# Patient Record
Sex: FEMALE | Race: WHITE | NOT HISPANIC OR LATINO | Employment: OTHER | URBAN - METROPOLITAN AREA
[De-identification: names, ages, dates, MRNs, and addresses within clinical notes are randomized per-mention and may not be internally consistent; named-entity substitution may affect disease eponyms.]

---

## 2021-08-02 ENCOUNTER — TRANSFERRED RECORDS (OUTPATIENT)
Dept: HEALTH INFORMATION MANAGEMENT | Facility: CLINIC | Age: 66
End: 2021-08-02

## 2022-02-15 DIAGNOSIS — I87.1 COMPRESSION OF VEIN: ICD-10-CM

## 2022-02-15 DIAGNOSIS — R42 MAL DE DEBARQUEMENT: ICD-10-CM

## 2022-02-15 DIAGNOSIS — G54.0 TOS (THORACIC OUTLET SYNDROME): Primary | ICD-10-CM

## 2022-03-22 ENCOUNTER — TRANSFERRED RECORDS (OUTPATIENT)
Dept: HEALTH INFORMATION MANAGEMENT | Facility: CLINIC | Age: 67
End: 2022-03-22
Payer: COMMERCIAL

## 2022-04-21 NOTE — TELEPHONE ENCOUNTER
FUTURE VISIT INFORMATION      FUTURE VISIT INFORMATION:    Date: 5/25/2022    Time: 130pm    Location: [facility]  REFERRAL INFORMATION:    Referring provider:  Self     Referring providers clinic:      Reason for visit/diagnosis  Mal De Debarquement     RECORDS REQUESTED FROM:       Clinic name Comments Records Status Imaging Status   [facility] Everywhere Requested                                    5/5/2022-[facility]    5/16/2022-2nd request for images to be mailed faxed to [facility] @ 835am

## 2022-05-23 ENCOUNTER — NURSE TRIAGE (OUTPATIENT)
Dept: NURSING | Facility: CLINIC | Age: 67
End: 2022-05-23

## 2022-05-23 ENCOUNTER — ANCILLARY PROCEDURE (OUTPATIENT)
Dept: CT IMAGING | Facility: CLINIC | Age: 67
End: 2022-05-23
Attending: PSYCHIATRY & NEUROLOGY
Payer: COMMERCIAL

## 2022-05-23 ENCOUNTER — APPOINTMENT (OUTPATIENT)
Dept: ULTRASOUND IMAGING | Facility: CLINIC | Age: 67
End: 2022-05-23
Attending: EMERGENCY MEDICINE
Payer: COMMERCIAL

## 2022-05-23 ENCOUNTER — HOSPITAL ENCOUNTER (EMERGENCY)
Facility: CLINIC | Age: 67
Discharge: HOME | End: 2022-05-23
Attending: EMERGENCY MEDICINE | Admitting: EMERGENCY MEDICINE
Payer: COMMERCIAL

## 2022-05-23 ENCOUNTER — ANCILLARY PROCEDURE (OUTPATIENT)
Dept: ULTRASOUND IMAGING | Facility: CLINIC | Age: 67
End: 2022-05-23
Attending: PSYCHIATRY & NEUROLOGY
Payer: COMMERCIAL

## 2022-05-23 VITALS
DIASTOLIC BLOOD PRESSURE: 84 MMHG | OXYGEN SATURATION: 97 % | RESPIRATION RATE: 16 BRPM | TEMPERATURE: 98 F | HEIGHT: 63 IN | SYSTOLIC BLOOD PRESSURE: 139 MMHG | WEIGHT: 140 LBS | HEART RATE: 85 BPM | BODY MASS INDEX: 24.8 KG/M2

## 2022-05-23 DIAGNOSIS — R42 MAL DE DEBARQUEMENT: ICD-10-CM

## 2022-05-23 DIAGNOSIS — I87.1 COMPRESSION OF VEIN: ICD-10-CM

## 2022-05-23 DIAGNOSIS — N95.0 POSTMENOPAUSAL VAGINAL BLEEDING: ICD-10-CM

## 2022-05-23 DIAGNOSIS — G54.0 TOS (THORACIC OUTLET SYNDROME): ICD-10-CM

## 2022-05-23 LAB
ABO/RH(D): NORMAL
ANION GAP SERPL CALCULATED.3IONS-SCNC: 7 MMOL/L (ref 3–14)
ANTIBODY SCREEN: NEGATIVE
APTT PPP: 31 SECONDS (ref 22–38)
BASOPHILS # BLD AUTO: 0.1 10E3/UL (ref 0–0.2)
BASOPHILS NFR BLD AUTO: 1 %
BUN SERPL-MCNC: 22 MG/DL (ref 7–30)
C TRACH DNA SPEC QL NAA+PROBE: NEGATIVE
CALCIUM SERPL-MCNC: 9.1 MG/DL (ref 8.5–10.1)
CHLORIDE BLD-SCNC: 105 MMOL/L (ref 94–109)
CLUE CELLS: NORMAL
CO2 SERPL-SCNC: 24 MMOL/L (ref 20–32)
CREAT SERPL-MCNC: 0.83 MG/DL (ref 0.52–1.04)
EOSINOPHIL # BLD AUTO: 0.2 10E3/UL (ref 0–0.7)
EOSINOPHIL NFR BLD AUTO: 2 %
ERYTHROCYTE [DISTWIDTH] IN BLOOD BY AUTOMATED COUNT: 13 % (ref 10–15)
GFR SERPL CREATININE-BSD FRML MDRD: 78 ML/MIN/1.73M2
GLUCOSE BLD-MCNC: 94 MG/DL (ref 70–99)
HCT VFR BLD AUTO: 37 % (ref 35–47)
HGB BLD-MCNC: 12.4 G/DL (ref 11.7–15.7)
IMM GRANULOCYTES # BLD: 0 10E3/UL
IMM GRANULOCYTES NFR BLD: 0 %
INR PPP: 0.99 (ref 0.85–1.15)
LYMPHOCYTES # BLD AUTO: 1.8 10E3/UL (ref 0.8–5.3)
LYMPHOCYTES NFR BLD AUTO: 25 %
MCH RBC QN AUTO: 32 PG (ref 26.5–33)
MCHC RBC AUTO-ENTMCNC: 33.5 G/DL (ref 31.5–36.5)
MCV RBC AUTO: 96 FL (ref 78–100)
MONOCYTES # BLD AUTO: 0.8 10E3/UL (ref 0–1.3)
MONOCYTES NFR BLD AUTO: 11 %
N GONORRHOEA DNA SPEC QL NAA+PROBE: NEGATIVE
NEUTROPHILS # BLD AUTO: 4.6 10E3/UL (ref 1.6–8.3)
NEUTROPHILS NFR BLD AUTO: 61 %
NRBC # BLD AUTO: 0 10E3/UL
NRBC BLD AUTO-RTO: 0 /100
PLATELET # BLD AUTO: 334 10E3/UL (ref 150–450)
POTASSIUM BLD-SCNC: 4 MMOL/L (ref 3.4–5.3)
RADIOLOGIST FLAGS: NORMAL
RBC # BLD AUTO: 3.87 10E6/UL (ref 3.8–5.2)
SODIUM SERPL-SCNC: 136 MMOL/L (ref 133–144)
SPECIMEN EXPIRATION DATE: NORMAL
TRICHOMONAS, WET PREP: NORMAL
WBC # BLD AUTO: 7.5 10E3/UL (ref 4–11)
WBC'S/HIGH POWER FIELD, WET PREP: NORMAL
YEAST, WET PREP: NORMAL

## 2022-05-23 PROCEDURE — 85730 THROMBOPLASTIN TIME PARTIAL: CPT | Performed by: EMERGENCY MEDICINE

## 2022-05-23 PROCEDURE — 96361 HYDRATE IV INFUSION ADD-ON: CPT | Performed by: EMERGENCY MEDICINE

## 2022-05-23 PROCEDURE — 70498 CT ANGIOGRAPHY NECK: CPT | Mod: GC | Performed by: RADIOLOGY

## 2022-05-23 PROCEDURE — 99284 EMERGENCY DEPT VISIT MOD MDM: CPT | Performed by: EMERGENCY MEDICINE

## 2022-05-23 PROCEDURE — 99284 EMERGENCY DEPT VISIT MOD MDM: CPT | Mod: 25 | Performed by: EMERGENCY MEDICINE

## 2022-05-23 PROCEDURE — 76830 TRANSVAGINAL US NON-OB: CPT

## 2022-05-23 PROCEDURE — 96360 HYDRATION IV INFUSION INIT: CPT | Performed by: EMERGENCY MEDICINE

## 2022-05-23 PROCEDURE — 85610 PROTHROMBIN TIME: CPT | Performed by: EMERGENCY MEDICINE

## 2022-05-23 PROCEDURE — 87491 CHLMYD TRACH DNA AMP PROBE: CPT | Performed by: EMERGENCY MEDICINE

## 2022-05-23 PROCEDURE — 93970 EXTREMITY STUDY: CPT | Performed by: RADIOLOGY

## 2022-05-23 PROCEDURE — 86901 BLOOD TYPING SEROLOGIC RH(D): CPT | Performed by: EMERGENCY MEDICINE

## 2022-05-23 PROCEDURE — 93930 UPPER EXTREMITY STUDY: CPT | Performed by: RADIOLOGY

## 2022-05-23 PROCEDURE — 36415 COLL VENOUS BLD VENIPUNCTURE: CPT | Performed by: EMERGENCY MEDICINE

## 2022-05-23 PROCEDURE — 76856 US EXAM PELVIC COMPLETE: CPT | Mod: 26 | Performed by: STUDENT IN AN ORGANIZED HEALTH CARE EDUCATION/TRAINING PROGRAM

## 2022-05-23 PROCEDURE — 87591 N.GONORRHOEAE DNA AMP PROB: CPT | Performed by: EMERGENCY MEDICINE

## 2022-05-23 PROCEDURE — 87210 SMEAR WET MOUNT SALINE/INK: CPT | Performed by: EMERGENCY MEDICINE

## 2022-05-23 PROCEDURE — 86850 RBC ANTIBODY SCREEN: CPT | Performed by: EMERGENCY MEDICINE

## 2022-05-23 PROCEDURE — 258N000003 HC RX IP 258 OP 636: Performed by: EMERGENCY MEDICINE

## 2022-05-23 PROCEDURE — 76830 TRANSVAGINAL US NON-OB: CPT | Mod: 26 | Performed by: STUDENT IN AN ORGANIZED HEALTH CARE EDUCATION/TRAINING PROGRAM

## 2022-05-23 PROCEDURE — 80048 BASIC METABOLIC PNL TOTAL CA: CPT | Performed by: EMERGENCY MEDICINE

## 2022-05-23 PROCEDURE — 85025 COMPLETE CBC W/AUTO DIFF WBC: CPT | Performed by: EMERGENCY MEDICINE

## 2022-05-23 PROCEDURE — 70496 CT ANGIOGRAPHY HEAD: CPT | Mod: GC | Performed by: RADIOLOGY

## 2022-05-23 RX ORDER — IOPAMIDOL 755 MG/ML
75 INJECTION, SOLUTION INTRAVASCULAR ONCE
Status: COMPLETED | OUTPATIENT
Start: 2022-05-23 | End: 2022-05-23

## 2022-05-23 RX ORDER — SODIUM CHLORIDE 9 MG/ML
INJECTION, SOLUTION INTRAVENOUS CONTINUOUS
Status: DISCONTINUED | OUTPATIENT
Start: 2022-05-23 | End: 2022-05-23 | Stop reason: HOSPADM

## 2022-05-23 RX ADMIN — SODIUM CHLORIDE 1000 ML: 9 INJECTION, SOLUTION INTRAVENOUS at 04:02

## 2022-05-23 RX ADMIN — IOPAMIDOL 75 ML: 755 INJECTION, SOLUTION INTRAVASCULAR at 10:54

## 2022-05-23 NOTE — ED TRIAGE NOTES
"Pt arrives ambulatory to triage w/ c/o vaginal bleeding since 12 am. Felt cramping in her abdomen and took motrin around 8 pm and felt a little \"off\". Hasn't had a period in 15-20 years. Takes estrogen and progesteron for menopause issues. No N/V. Flew in from out of town today for a brain mri tomorrow and to see a specialist here. A&O, VSS.       "

## 2022-05-23 NOTE — DISCHARGE INSTRUCTIONS
Please be sure to follow-up with your gynecologist upon return to [place]    Return immediately for any worsening vaginal bleeding, lightheadedness, weakness, abdominal pain or any other concerns for worsening.    Continue taking your regular medications.

## 2022-05-23 NOTE — ED PROVIDER NOTES
"ED Provider Note  [facility]      History     Chief Complaint   Patient presents with     Vaginal Bleeding     HPI  Tasha Bernstein is a 66 year old female with hx of migraines; HLD, nephrolithiasis, s/p menopause, OA and DJD, currently undergoing work-up for gait and movement abnormalities by neurology, who presents with vaginal bleeding.    Patient has been on estradiol and progesterone for postmenopausal syndrome.  No change in medication since October    Per her report she has been seen by gynecology in the past for evaluation of her endometrial lining the details of this are uncertain at this time.    She has flown from [place] to [facility] for evaluation by neurology.  Tonight acutely developed sensation of suprapubic cramping and significant vaginal bleeding-large clot while sitting on toilet.  Has soaked tampons/pads in approximately 2 hours but now believes that the bleeding has slowed down.    No associated lightheadedness or weakness.   No chest pain or shortness of breath.  Has had no vaginal bleeding since became postmenopausal.    No fevers or chills no dysuria, no preceding vaginal discharge.          Past Medical History  No past medical history on file.  No past surgical history on file.  No current outpatient medications on file.    Allergies   Allergen Reactions     Azithromycin      Bees      Erythromycin      Family History  No family history on file.  Social History       Past medical history, past surgical history, medications, allergies, family history, and social history were reviewed with the patient. No additional pertinent items.       Review of Systems  A complete review of systems was performed with pertinent positives and negatives noted in the HPI, and all other systems negative.    Physical Exam   BP: (!) 135/107  Pulse: 78  Temp: 98  F (36.7  C)  Resp: 16  Height: 160 cm (5' 3\")  Weight: 63.5 kg (140 lb)  SpO2: 99 %  Physical Exam  GEN: Well " appearing, non toxic, cooperative and conversant.   HEENT: The head is normocephalic and atraumatic. Pupils are equal round and reactive to light. Extraocular motions are intact. There is no facial swelling.   CV: Regular rate   PULM: Unlabored breathing     EXT: Full range of motion.  No edema.  NEURO: Cranial nerves II through XII are intact and symmetric. Bilateral upper and lower extremities grossly show full range of motion without any focal deficits.     PSYCH: Calm and cooperative, interactive.   Pelvic exam:  Normal external female genitalia. Normal vaginal mucosa. No discharge or drainage.  The cervical Os is closed.  No CMT no adnexal TTP.   No Masses noted.      ED Course      Procedures                     Results for orders placed or performed during the hospital encounter of 05/23/22   US Pelvic Complete with Transvaginal     Status: None (Preliminary result)    Impression    RESIDENT PRELIMINARY INTERPRETATION  IMPRESSION:   1.  The endometrium is heterogeneous and thickened measuring up to 1.2  cm. In a postmenopausal patient this is considered thickened.  Recommend OB/GYN consultation.  2.  The ovaries are not well seen. No suspicious adnexal lesions.     Basic metabolic panel     Status: Normal   Result Value Ref Range    Sodium 136 133 - 144 mmol/L    Potassium 4.0 3.4 - 5.3 mmol/L    Chloride 105 94 - 109 mmol/L    Carbon Dioxide (CO2) 24 20 - 32 mmol/L    Anion Gap 7 3 - 14 mmol/L    Urea Nitrogen 22 7 - 30 mg/dL    Creatinine 0.83 0.52 - 1.04 mg/dL    Calcium 9.1 8.5 - 10.1 mg/dL    Glucose 94 70 - 99 mg/dL    GFR Estimate 78 >60 mL/min/1.73m2   INR     Status: Normal   Result Value Ref Range    INR 0.99 0.85 - 1.15   Partial thromboplastin time     Status: Normal   Result Value Ref Range    aPTT 31 22 - 38 Seconds   CBC with platelets and differential     Status: None   Result Value Ref Range    WBC Count 7.5 4.0 - 11.0 10e3/uL    RBC Count 3.87 3.80 - 5.20 10e6/uL    Hemoglobin 12.4 11.7 -  15.7 g/dL    Hematocrit 37.0 35.0 - 47.0 %    MCV 96 78 - 100 fL    MCH 32.0 26.5 - 33.0 pg    MCHC 33.5 31.5 - 36.5 g/dL    RDW 13.0 10.0 - 15.0 %    Platelet Count 334 150 - 450 10e3/uL    % Neutrophils 61 %    % Lymphocytes 25 %    % Monocytes 11 %    % Eosinophils 2 %    % Basophils 1 %    % Immature Granulocytes 0 %    NRBCs per 100 WBC 0 <1 /100    Absolute Neutrophils 4.6 1.6 - 8.3 10e3/uL    Absolute Lymphocytes 1.8 0.8 - 5.3 10e3/uL    Absolute Monocytes 0.8 0.0 - 1.3 10e3/uL    Absolute Eosinophils 0.2 0.0 - 0.7 10e3/uL    Absolute Basophils 0.1 0.0 - 0.2 10e3/uL    Absolute Immature Granulocytes 0.0 <=0.4 10e3/uL    Absolute NRBCs 0.0 10e3/uL   Adult Type and Screen     Status: None   Result Value Ref Range    ABO/RH(D) O POS     Antibody Screen Negative Negative    SPECIMEN EXPIRATION DATE [id number]    Wet preparation     Status: Normal    Specimen: Vagina; Swab   Result Value Ref Range    Trichomonas Absent Absent    Yeast Absent Absent    Clue Cells Absent Absent    WBCs/high power field None None   CBC with platelets differential     Status: None    Narrative    The following orders were created for panel order CBC with platelets differential.  Procedure                               Abnormality         Status                     ---------                               -----------         ------                     CBC with platelets and d...[[id number]]                      Final result                 Please view results for these tests on the individual orders.   ABO/Rh type and screen     Status: None    Narrative    The following orders were created for panel order ABO/Rh type and screen.  Procedure                               Abnormality         Status                     ---------                               -----------         ------                     Adult Type and Screen[[id number]]                            Final result                 Please view results for these tests on the  individual orders.     Medications   0.9% sodium chloride BOLUS (0 mLs Intravenous Stopped 5/23/22 0620)     Followed by   sodium chloride 0.9% infusion ( Intravenous Not Given 5/23/22 0635)        Assessments & Plan (with Medical Decision Making)   66-year-old female with history as noted presenting with postmenopausal bleeding    DDx includes fibroids, malignancy, hormonal responses to estradiol/progesterone (less likely), trauma, coagulopathy, thrombocytopenia, among other causes    Clinically the patient is nontoxic and well-appearing.  Laboratories notable for normal platelets and hemoglobin 12.5  Pelvic exam with with blood in vaginal vault.  No active bleeding through cervix, closed os    Pelvic ultrasound ordered to evaluate for gross malignant lesions and none is identified.  Endometrium appears thickened for postmenopausal state as noted above.     - SERP  - GYN f/u as outpt- has this arranged for when returns to [place].   - continue regular medications     - Patient agrees to our plan and is ready and eager for discharge. Care plan, follow up plan, and reasons to return immediately to the ED were dicussed in detail and summarized as noted in the discharge instructions.      I have reviewed the nursing notes. I have reviewed the findings, diagnosis, plan and need for follow up with the patient.    New Prescriptions    No medications on file       Final diagnoses:   Postmenopausal vaginal bleeding       --  Haider Livingston MD   [facility] EMERGENCY DEPARTMENT  5/23/2022     Haider Livingston MD  05/23/22 2516

## 2022-05-23 NOTE — TELEPHONE ENCOUNTER
Pt called stating that she is bleeding vaginally and she has not had a period in over 10 years.     Pt is having some cramping     Pt states that she is going through over 2 tampons in a hour, pt was sitting on the toilet during the call because of the heavy bleeding      Per protocol - Go to ED now     Care advice given per protocol and when to call back. Pt verbalized understanding and agrees to plan of care.    Yamel Queen RN  [facility] Nurse Advisor  1:47 AM 5/23/2022      COVID 19 Nurse Triage Plan/Patient Instructions    Please be aware that novel coronavirus (COVID-19) may be circulating in the community. If you develop symptoms such as fever, cough, or SOB or if you have concerns about the presence of another infection including coronavirus (COVID-19), please contact your health care provider or visit [email or web address].     Disposition/Instructions    ED Visit recommended. Follow protocol based instructions.     Bring Your Own Device:  Please also bring your smart device(s) (smart phones, tablets, laptops) and their charging cables for your personal use and to communicate with your care team during your visit.    Thank you for taking steps to prevent the spread of this virus.  o Limit your contact with others.  o Wear a simple mask to cover your cough.  o Wash your hands well and often.    Resources    [facility]: About COVID-19: [email or web address]    CDC: What to Do If You're Sick: www.cdc.gov/coronavirus/2019-ncov/about/steps-when-sick.html    CDC: Ending Home Isolation: www.cdc.gov/coronavirus/2019-ncov/hcp/disposition-in-home-patients.html     CDC: Caring for Someone: www.cdc.gov/coronavirus/2019-ncov/if-you-are-sick/care-for-someone.html     [facility]: Interim Guidance for Hospital Discharge to Home: www.health.[facility].[place]/diseases/coronavirus/hcp/hospdischarge.pdf    [facility] clinical trials (COVID-19 research studies):  clinicalaffairs.[facility]-clinical-trials     Below are the COVID-19 hotlines at the [place] Department of Health ([facility]). Interpreters are available.   o For health questions: Call 868-711-3097 or 1-622.141.5343 (7 a.m. to 7 p.m.)  o For questions about schools and childcare: Call 083-279-8855 or 1-489.877.5695 (7 a.m. to 7 p.m.)      Reason for Disposition    Vaginal bleeding is main symptom    SEVERE vaginal bleeding (e.g., soaking 2 pads or tampons per hour and present 2 or more hours; 1 menstrual cup every 2 hours)    Additional Information    Negative: Sounds like a life-threatening emergency to the triager    Negative: Followed a genital area injury    Negative: Foreign body in vagina (e.g., tampon)    Negative: Shock suspected (e.g., cold/pale/clammy skin, too weak to stand, low BP, rapid pulse)    Negative: Difficult to awaken or acting confused (e.g., disoriented, slurred speech)    Negative: Passed out (i.e., lost consciousness, collapsed and was not responding)    Negative: Sounds like a life-threatening emergency to the triager    Negative: Followed a genital area injury    Negative: Pregnant > 20 weeks  (5 months or more)    Negative: Pregnant < 20 weeks  (less than 5 months)    Negative: Postpartum (from 0 to 6 weeks after delivery)    Negative: Bleeding occurring > 12 months after menopause    Negative: Bleeding from sexual abuse or rape    Negative: [1] Vaginal discharge is main symptom AND [2] small amount of blood    Negative: SEVERE abdominal pain    Negative: SEVERE dizziness (e.g., unable to stand, requires support to walk, feels like passing out now)    Protocols used: VAGINAL SYMPTOMS-A-AH, VAGINAL BLEEDING - [id number]

## 2022-05-23 NOTE — RESULT ENCOUNTER NOTE
Final result for both N. Gonorrhoeae PCR and Chlamydia Trachomatis PCR are NEGATIVE.  No treatment or change in treatment per [facility] ED Lab Result N. Gonorrhea AND/OR Chlamydia T. protocol.

## 2022-05-24 NOTE — PROGRESS NOTES
[facility]    Neurology Consult    5/24/2022      Tasha Bernstein MRN# [id number]   YOB: 1955 Age: 66 year old      Primary care provider:   System, Provider Not In    Requesting provider:   Self    Reason for Consult:  Mal de Debarquement Syndrome    IMPRESSIONS:  Tasha Bernstein is a 66-year-old woman with a past medical history of migraine headaches going back to age 11 with symptoms predominantly on the left side over the left eye with radiation to the occiput shoulder and neck developed persistent oscillating vertigo since a trip to [facility] in October 2021. She had a prior episode of persistent Mal de Debarquement Syndrome (MdDS) that resolved on its own. There is reduced venous velocities in the left subclavian vein with reduced internal jugular vein velocities with leftward head turning. This would suggest that the internal jugular is being compressed under the sternocleidomastoid muscle since there is no compression at the skull base. She may get some benefit from focused physical therapy to relax these muscles (SCM, anterior scalene muscles). She can also palliate with trying a little bit of clonazepam first and then venlafaxine if not sufficiently effective. Since she is from [place], she will need local management of her medications but I would be happy to be available for discussion with her local physicians.     Recommendations:  1. Physical therapy--1st rib mobilization, anterior scalene stretches, sternocleidomastoid, ergonomic awareness. Do not sleep with arms elevated.   2. Follow-up after imaging  3.   Clonazepam 0.25mg once or twice a day to start. This can be escalate to not more than 0.5mg twice a day  4.   Could also start with venlafaxine 12.5mg to start with a gradual increase to 37.5mg as tolerated    HISTORY OF PRESENT ILLNESS:  Tasha Bernstein is a 66 year old female with a past medical history of migraines and persistent  "MdDS.    DIZZINESS:  She has had been on many cruises, about 10 in her lifetime, without any problems but the more recent trips were associated with some post motion rocking for a short time. After a cruise in 2018, she had developed rocking vertigo ([facility]) for 4-5 months that resolved on its own. She went on a cruise in May 2019 ([place]) without problems. She went on a 3-4 hour boat trip for  after that which was okay, too.     She went on a trip to [facility] 2021 which has been persistent. She took a small plane to [facility] from [facility] and took a small boat to a [place]. She had a great time on the trip. She had minor rocking while she was there. Her problems were really bad when she returned to the [facility]. She felt the rocking immediately after landing back in the [facility]. She also had a terrible headache in the middle of the trip while in [facility] but this was a typical migraine for her.      The current feeling is a swaying and a rocking. She feels better when she is driving. She feels worse walking. She has not been able to \"walk it off.\" She feels that it doesn't change with laying down but she does feel it when laying down. It doesn't wake her up. There is no body position effect.     Symptoms did get worse in 2021 after a [place] light show (got on a bus with lots of lights through a garden). She was also worse walking through a casino. Visual stimulation does not usually worsen symptoms, however except in these extreme events.     She does not notice the rocking going with her pulse. She has an escalation of symptoms after activity and being in car. She has not noticed any head position that worsens the rocking. She does lose her balance with head extension and bending over. It can make her nauseated and fatigued. She can also seem a bit 'scattered.' She has not been able to focus like she used to. 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    She has tried acetazolamide (125mg three times a day--taken for about two weeks) with no improvement. She tried spironolactone and had a bad reaction. She couldn't walk because of worsened imbalance and worsened rocking. She has never tried clonazepam or venlafaxine.     AURAL SYMPTOMS:  There is tinnitus in the left ear, since at least 2016. It is not a pulsatile. It is high pitched. It is not in the right ear. There is no ear pressure. There is some hearing loss in the left ear.     HEADACHE: Headache is described as a pulsation and pressure. Headache has been treated with acetazolamide without success. Patient does have a history of renal stones. The headaches have been left sided left eye, left ear, left neck, and left shoulder. The pain is behind the eye, pressure from the back. There is no visual problem. There is no tearing, redness, lid drooping. There is no visual aura. There is nausea but no emesis. This has been treated with fiorinal with effect and has been transitioned to Nurtec but she has not tried the Nurtec, yet. She has had 30-50 migraines in the last year. They tend to cluster. They last about a day. She has done very well this year, however as her last migraine was on 12-29-21.  The migraine severity does not correlate with the rocking.     NECK/UPPER EXT SYMPTOMS:  The left sided neck pain is only there when she has a migraine. The pain does not go down the arm or hand. There has been numbness and tingling in the hands with some sense of throbbing in both hands as a separate phenomenon. She has had a 'zinging,' sensation down the underarm down the left side, and maybe the right. There is no swelling, color changes in the hands. There is no arm position dependence. The paresthesias did improve with pectoralis stretches on a foam roller. Patient notes sleeping with her arms elevated.     BULBAR SYMPTOMS:  The is no dysphagia, throat pressure, chronic cough.    CARDIAC:  There is no  chest pain, shortness of breath, palpitation, syncope.    OTHER:   She had vaginal bleeding recently. She has pelvic pressure.    PAST MEDICAL HISTORY:  Arthritis  Skin cancers removed  Kidney stones  Narcotic inefficacy    PAST SURGICAL HISTORY:  Skin cancers removed     SOCIAL HISTORY:     ALLERGIES:  Allergies   Allergen Reactions     Azithromycin      Bees      Erythromycin      MEDICATIONS:    Current Outpatient Medications:      albuterol (PROAIR HFA/PROVENTIL HFA/VENTOLIN HFA) 108 (90 Base) MCG/ACT inhaler, INHALE 1 PUFF EVERY FOUR HOURS AS NEEDED FOR SHORTNESS OF BREATH OR WHEEZING, Disp: , Rfl:      cholecalciferol 50 MCG (2000 UT) tablet, Take 2,000 Units by mouth, Disp: , Rfl:      diclofenac (VOLTAREN) 1 % topical gel, APPLY 2 G TOPICALLY FOUR (4) TIMES DAILY AS NEEDED., Disp: , Rfl:      hydrocortisone 2.5 % cream, , Disp: , Rfl:      progesterone (PROMETRIUM) 100 MG capsule, TAKE 1 CAPSULE BY MOUTH EVERY DAY, Disp: , Rfl:      Rimegepant Sulfate (NURTEC) 75 MG TBDP, Take 75 mg by mouth, Disp: , Rfl:      valACYclovir (VALTREX) 1000 mg tablet, Take 1,000 mg by mouth, Disp: , Rfl:      PHYSICAL EXAM:  Vitals:  BP (!) 138/95   Pulse 92   Resp 16   Wt 65.3 kg (144 lb)   SpO2 97%   BMI 25.51 kg/m      General: Patient is well-nourished, well-groomed, in no apparent distress. Rocking frequency is 0.703 Hz    HEENT: Head is atraumatic, eyes look normal exteriorly, throat clear, neck supple. Pain under the left ear behind angle of jaw. There is some right sided neck tenderness.   Ext: Warm, well-perfused. No edema.    Neurologic:  Mental Status: Alert and oriented to person, place, time, and situation.  Able to provide an excellent history.     Cranial Nerves: Visual fields full to confrontation.  Pupils equal and reactive to light.  Extraocular movements full.  Face sensation normal.  Normal head impulse testing.  Normal hearing to finger rub. Face symmetric with normal movements. Tongue and  palate midline.  Normal shoulder elevation.      Motor: Normal bulk and tone.  No pronator drift.  Normal foot taps.  Full strength to confrontational testing.    Sensory: Normal light touch, temperature, and vibration.    Reflexes: Biceps, Brachioradialis, Triceps, Knees, Ankles 2/4.     Coordination: Normal finger to nose, rapid alternating movements    Gait: Normal stance width.  Negative Romberg.  Good gait initiation.  Good stride length.  Good arm swing.  Normal turn. Able to walk 5 steps in tandem with some difficulty but no side-stepping.      Focused Vestibular:  Positional Testing: Patient was placed in the supine, right ear down, left ear down, right head-hanging, center-hanging, and left head-hanging positions.  There was no nystagmus or vertigo elicited.     Adson's test for Thoracic Outlet Syndrome:  Arms abducted: Negative     Pain under the RIGHT clavicle: No  Pain at the RIGHT 1st rib-sternum insertion site: No  Pain under the LEFT clavicle: No  Pain at the LEFT 1st rib-sternum insertion site: No     DATA:  All available and relevant labs, imaging, and other procedures were reviewed personally.   Last brain imaging:    CTV Head w Contrast 5-23-22  Narrative: CT venogram without and with intravenous contrast  Reconstruction by the Radiologist on the 3D workstation    History:  66F with chronic rocking vertigo. Rule out venous  compression. Styloid length.; TOS (thoracic outlet syndrome);  Compression of vein; Mal de debarquement.  ICD-10: TOS (thoracic outlet syndrome); Compression of vein; Mal de  debarquement    Head CTV demonstrates no definite occlusion or thrombus within the  major dural and deep intracranial venous sinuses.  The major intracranial arteries are grossly patent without definite  aneurysm or stenosis.    CT NECK: Evaluation of the mucosal space demonstrates no evident  abnormality in the nasopharynx, oropharynx, hypopharynx or the  glottis. The tongue base appears normal. The major  salivary glands  appear unremarkable. The thyroid gland appears normal. The left  styloid process is approximately 15.5 mm, the right styloid process  measures approximately 13.5 mm.    There is no evident cervical lymphadenopathy. The fascial spaces in  the neck are intact bilaterally. The major vascular structures in the  neck appear unremarkable.    Evaluation of the osseous structures demonstrate no worrisome lytic or  sclerotic lesion. Multilevel degenerative changes of the cervical  spine. There is moderate spinal canal narrowing most prominent at the  C5-6 level. There is multilevel neural foraminal narrowing. The  visualized paranasal sinuses are clear. The mastoid air cells are  clear.     The visualized lung apices are clear.  Impression: Impression:  1. No evidence of  thrombus intracranially within the major  intracranial venous sinuses.  2. Normal CT study of the neck with contrast.  No evident mass or  adenopathy within the neck.    I have personally reviewed the examination and initial interpretation  and I agree with the findings.    SOPHIA MARTINEZ MD       [facility] [employer] [facility] Thor Outlet Syn Bilat  THORACIC INLET/OUTLET DUPLEX ULTRASOUND 5/23/2022    FINDINGS:  RIGHT:       REST:            INTERNAL JUGULAR VEIN: 45 cm/s, phasic, fully compressible            INNOMINATE VEIN: 40 cm/s, phasic            SUBCLAVIAN VEIN, medial: 150 cm/s, phasic            SUBCLAVIAN VEIN, mid: 79 cm/s, phasic, fully compressible            SUBCLAVIAN VEIN, lateral: 58 cm/s, phasic, fully  compressible            AXILLARY VEIN: 46 cm/s, phasic, fully compressible         MID SUBCLAVIAN VEIN, sitting erect:            0 degrees: 34 cm/s, phasic            90 degrees: 43 cm/s, phasic            135 degrees: 192 cm/s, phasic            180 degrees: 173 cm/s, phasic         INTERNAL JUGULAR VEIN, sitting erect:            Neutral: 70 cm/s, phasic            Right: 80 cm/s, phasic            Left: 112 cm/s, phasic             Extension: 109 cm/s, phasic            Flexion: 106 cm/s, phasic         PPGs:            Baseline: Normal            Arms 90: Normal            Arms 180: ABNORMAL - diminished              : Normal             head right: ABNORMAL - diminished             head left: Normal    LEFT:       REST:            INTERNAL JUGULAR VEIN: 62 cm/s, phasic, fully compressible            INNOMINATE VEIN: 62 cm/s, phasic            SUBCLAVIAN VEIN, medial: 110 cm/s, phasic            SUBCLAVIAN VEIN, mid: 101 cm/s, phasic, fully compressible            SUBCLAVIAN VEIN, lateral: 88 cm/s, phasic, fully  compressible            AXILLARY VEIN: 39 cm/s, phasic, fully compressible         MID SUBCLAVIAN VEIN, sitting erect:            0 degrees: 17 cm/s, blunted            90 degrees: 10 cm/s, blunted            135 degrees: 135 cm/s, phasic            180 degrees: 189 cm/s, phasic         INTERNAL JUGULAR VEIN, sitting erect:            Neutral: 192 cm/s, phasic            Right: 227 cm/s, phasic            Left: 67 cm/s, phasic            Extension: 126 cm/s, phasic            Flexion: 109 cm/s, phasic        PPGs:            Baseline: Normal            Arms 90: Normal            Arms 180: ABNORMAL - OCCLUDED            : ABNORMAL - diminished             head right: ABNORMAL - diminished             head left: ABNORMAL - diminished  Impression: IMPRESSION: Thoracic outlet/inlet physiology suggested. Correlate for  symptoms.    1. RIGHT:       A. 3.75 velocity ratio from the medial subclavian vein to the  innominate vein may suggest stenosis at rest. Waveforms peripherally  remain phasic.       B. Subclavian venous narrowing suggested in 135 and 180 degrees.  No occlusion demonstrated.       C. No internal jugular venous stenosis suggested with maneuvers.       D. PPG diminished in Arms 180 and  head right. No  occlusion demonstrated.    2. LEFT:       A. No subclavian venous  stenosis suggested at rest.       B. Subclavian waveforms blunted in 0 and 90 degrees, etiology not  demonstrated. Subclavian venous narrowing suggested in 135 and 180  degrees. No occlusion demonstrated.       C. No internal jugular venous stenosis suggested with maneuvers.       D. PPG occluded in Arms 180. PPG diminished in   positions.    EMERY BARBER MD     76-minutes were spent in evaluation, examination, and documentation.

## 2022-05-25 ENCOUNTER — PRE VISIT (OUTPATIENT)
Dept: NEUROLOGY | Facility: CLINIC | Age: 67
End: 2022-05-25
Payer: COMMERCIAL

## 2022-05-25 ENCOUNTER — OFFICE VISIT (OUTPATIENT)
Dept: NEUROLOGY | Facility: CLINIC | Age: 67
End: 2022-05-25
Payer: COMMERCIAL

## 2022-05-25 VITALS
BODY MASS INDEX: 25.51 KG/M2 | HEART RATE: 92 BPM | DIASTOLIC BLOOD PRESSURE: 95 MMHG | RESPIRATION RATE: 16 BRPM | OXYGEN SATURATION: 97 % | SYSTOLIC BLOOD PRESSURE: 138 MMHG | WEIGHT: 144 LBS

## 2022-05-25 DIAGNOSIS — I87.1 COMPRESSION OF VEIN: ICD-10-CM

## 2022-05-25 DIAGNOSIS — G54.0 TOS (THORACIC OUTLET SYNDROME): ICD-10-CM

## 2022-05-25 DIAGNOSIS — R42 MAL DE DEBARQUEMENT: Primary | ICD-10-CM

## 2022-05-25 PROCEDURE — 99205 OFFICE O/P NEW HI 60 MIN: CPT | Performed by: PSYCHIATRY & NEUROLOGY

## 2022-05-25 RX ORDER — PROGESTERONE 100 MG/1
CAPSULE ORAL
COMMUNITY
Start: 2022-05-18

## 2022-05-25 RX ORDER — HYDROCORTISONE 2.5 %
CREAM (GRAM) TOPICAL
COMMUNITY
Start: 2022-05-20

## 2022-05-25 RX ORDER — RIMEGEPANT SULFATE 75 MG/75MG
75 TABLET, ORALLY DISINTEGRATING ORAL
COMMUNITY
Start: 2022-04-18

## 2022-05-25 RX ORDER — ALBUTEROL SULFATE 90 UG/1
AEROSOL, METERED RESPIRATORY (INHALATION)
COMMUNITY
Start: 2021-10-16

## 2022-05-25 RX ORDER — VALACYCLOVIR HYDROCHLORIDE 1 G/1
1000 TABLET, FILM COATED ORAL
COMMUNITY
Start: 2022-05-20

## 2022-05-25 ASSESSMENT — PAIN SCALES - GENERAL: PAINLEVEL: NO PAIN (0)

## 2022-05-25 NOTE — LETTER
5/25/2022       RE: Tasha Bernstein  [address] [place] [address]     Dear Colleague,    Thank you for referring your patient, Tasha Bernstein, to the [facility] [place] at [facility]. Please see a copy of my visit note below.    [facility]    Neurology Consult    5/24/2022      Tasha Bernstein MRN# [id number]   YOB: 1955 Age: 66 year old      Primary care provider:   System, Provider Not In    Requesting provider:   Self    Reason for Consult:  Mal de Debarquement Syndrome    IMPRESSIONS:  Tasha Bernstein is a 66-year-old woman with a past medical history of migraine headaches going back to age 11 with symptoms predominantly on the left side over the left eye with radiation to the occiput shoulder and neck developed persistent oscillating vertigo since a trip to [facility] in October 2021. She had a prior episode of persistent Mal de Debarquement Syndrome (MdDS) that resolved on its own. There is reduced venous velocities in the left subclavian vein with reduced internal jugular vein velocities with leftward head turning. This would suggest that the internal jugular is being compressed under the sternocleidomastoid muscle since there is no compression at the skull base. She may get some benefit from focused physical therapy to relax these muscles (SCM, anterior scalene muscles). She can also palliate with trying a little bit of clonazepam first and then venlafaxine if not sufficiently effective. Since she is from [place], she will need local management of her medications but I would be happy to be available for discussion with her local physicians.     Recommendations:  1. Physical therapy--1st rib mobilization, anterior scalene stretches, sternocleidomastoid, ergonomic awareness. Do not sleep with arms elevated.   2. Follow-up after imaging  3.   Clonazepam 0.25mg once or twice  "a day to start. This can be escalate to not more than 0.5mg twice a day  4.   Could also start with venlafaxine 12.5mg to start with a gradual increase to 37.5mg as tolerated    HISTORY OF PRESENT ILLNESS:  Tasha Bernstein is a 66 year old female with a past medical history of migraines and persistent MdDS.    DIZZINESS:  She has had been on many cruises, about 10 in her lifetime, without any problems but the more recent trips were associated with some post motion rocking for a short time. After a cruise in 2018, she had developed rocking vertigo ([facility]) for 4-5 months that resolved on its own. She went on a cruise in May 2019 ([place]) without problems. She went on a 3-4 hour boat trip for  after that which was okay, too.     She went on a trip to [facility] 2021 which has been persistent. She took a small plane to [facility] from [facility] and took a small boat to a motu. She had a great time on the trip. She had minor rocking while she was there. Her problems were really bad when [place] She felt the rocking immediately after landing back [place]. She also had a terrible headache in the middle of the trip while in [facility] but this was a typical migraine for her.      The current feeling is a swaying and a rocking. She feels better when she is driving. She feels worse walking. She has not been able to \"walk it off.\" She feels that it doesn't change with laying down but she does feel it when laying down. It doesn't wake her up. There is no body position effect.     Symptoms did get worse in 2021 after a [place] light show (got on a bus with lots of lights through a garden). She was also worse walking through a casino. Visual stimulation does not usually worsen symptoms, however except in these extreme events.     She does not notice the rocking going with her pulse. She has an escalation of symptoms after activity and being in car. She has " not noticed any head position that worsens the rocking. She does lose her balance with head extension and bending over. It can make her nauseated and fatigued. She can also seem a bit 'scattered.' She has not been able to focus like she used to. 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    She has tried acetazolamide (125mg three times a day--taken for about two weeks) with no improvement. She tried spironolactone and had a bad reaction. She couldn't walk because of worsened imbalance and worsened rocking. She has never tried clonazepam or venlafaxine.     AURAL SYMPTOMS:  There is tinnitus in the left ear, since at least 2016. It is not a pulsatile. It is high pitched. It is not in the right ear. There is no ear pressure. There is some hearing loss in the left ear.     HEADACHE: Headache is described as a pulsation and pressure. Headache has been treated with acetazolamide without success. Patient does have a history of renal stones. The headaches have been left sided left eye, left ear, left neck, and left shoulder. The pain is behind the eye, pressure from the back. There is no visual problem. There is no tearing, redness, lid drooping. There is no visual aura. There is nausea but no emesis. This has been treated with fiorinal with effect and has been transitioned to Nurtec but she has not tried the Nurtec, yet. She has had 30-50 migraines in the last year. They tend to cluster. They last about a day. She has done very well this year, however as her last migraine was on 12-29-21.  The migraine severity does not correlate with the rocking.     NECK/UPPER EXT SYMPTOMS:  The left sided neck pain is only there when she has a migraine. The pain does not go down the arm or hand. There has been numbness and tingling in the hands with some sense of throbbing in both hands as a separate phenomenon. She has had a 'zinging,' sensation down the underarm down the left side, and maybe the right.  There is no swelling, color changes in the hands. There is no arm position dependence. The paresthesias did improve with pectoralis stretches on a foam roller. Patient notes sleeping with her arms elevated.     BULBAR SYMPTOMS:  The is no dysphagia, throat pressure, chronic cough.    CARDIAC:  There is no chest pain, shortness of breath, palpitation, syncope.    OTHER:   She had vaginal bleeding recently. She has pelvic pressure.    PAST MEDICAL HISTORY:  Arthritis  Skin cancers removed  Kidney stones  Narcotic inefficacy    PAST SURGICAL HISTORY:  Skin cancers removed     SOCIAL HISTORY:     ALLERGIES:  Allergies   Allergen Reactions     Azithromycin      Bees      Erythromycin      MEDICATIONS:    Current Outpatient Medications:      albuterol (PROAIR HFA/PROVENTIL HFA/VENTOLIN HFA) 108 (90 Base) MCG/ACT inhaler, INHALE 1 PUFF EVERY FOUR HOURS AS NEEDED FOR SHORTNESS OF BREATH OR WHEEZING, Disp: , Rfl:      cholecalciferol 50 MCG (2000 UT) tablet, Take 2,000 Units by mouth, Disp: , Rfl:      diclofenac (VOLTAREN) 1 % topical gel, APPLY 2 G TOPICALLY FOUR (4) TIMES DAILY AS NEEDED., Disp: , Rfl:      hydrocortisone 2.5 % cream, , Disp: , Rfl:      progesterone (PROMETRIUM) 100 MG capsule, TAKE 1 CAPSULE BY MOUTH EVERY DAY, Disp: , Rfl:      Rimegepant Sulfate (NURTEC) 75 MG TBDP, Take 75 mg by mouth, Disp: , Rfl:      valACYclovir (VALTREX) 1000 mg tablet, Take 1,000 mg by mouth, Disp: , Rfl:      PHYSICAL EXAM:  Vitals:  BP (!) 138/95   Pulse 92   Resp 16   Wt 65.3 kg (144 lb)   SpO2 97%   BMI 25.51 kg/m      General: Patient is well-nourished, well-groomed, in no apparent distress. Rocking frequency is 0.703 Hz    HEENT: Head is atraumatic, eyes look normal exteriorly, throat clear, neck supple. Pain under the left ear behind angle of jaw. There is some right sided neck tenderness.   Ext: Warm, well-perfused. No edema.    Neurologic:  Mental Status: Alert and oriented to person, place, time, and  situation.  Able to provide an excellent history.     Cranial Nerves: Visual fields full to confrontation.  Pupils equal and reactive to light.  Extraocular movements full.  Face sensation normal.  Normal head impulse testing.  Normal hearing to finger rub. Face symmetric with normal movements. Tongue and palate midline.  Normal shoulder elevation.      Motor: Normal bulk and tone.  No pronator drift.  Normal foot taps.  Full strength to confrontational testing.    Sensory: Normal light touch, temperature, and vibration.    Reflexes: Biceps, Brachioradialis, Triceps, Knees, Ankles 2/4.     Coordination: Normal finger to nose, rapid alternating movements    Gait: Normal stance width.  Negative Romberg.  Good gait initiation.  Good stride length.  Good arm swing.  Normal turn. Able to walk 5 steps in tandem with some difficulty but no side-stepping.      Focused Vestibular:  Positional Testing: Patient was placed in the supine, right ear down, left ear down, right head-hanging, center-hanging, and left head-hanging positions.  There was no nystagmus or vertigo elicited.     Adson's test for Thoracic Outlet Syndrome:  Arms abducted: Negative     Pain under the RIGHT clavicle: No  Pain at the RIGHT 1st rib-sternum insertion site: No  Pain under the LEFT clavicle: No  Pain at the LEFT 1st rib-sternum insertion site: No     DATA:  All available and relevant labs, imaging, and other procedures were reviewed personally.   Last brain imaging:    CTV Head w Contrast 5-23-22  Narrative: CT venogram without and with intravenous contrast  Reconstruction by the Radiologist on the 3D workstation    History:  66F with chronic rocking vertigo. Rule out venous  compression. Styloid length.; TOS (thoracic outlet syndrome);  Compression of vein; Mal de debarquement.  ICD-10: TOS (thoracic outlet syndrome); Compression of vein; Mal de  debarquement    Head CTV demonstrates no definite occlusion or thrombus within the  major dural and  deep intracranial venous sinuses.  The major intracranial arteries are grossly patent without definite  aneurysm or stenosis.    CT NECK: Evaluation of the mucosal space demonstrates no evident  abnormality in the nasopharynx, oropharynx, hypopharynx or the  glottis. The tongue base appears normal. The major salivary glands  appear unremarkable. The thyroid gland appears normal. The left  styloid process is approximately 15.5 mm, the right styloid process  measures approximately 13.5 mm.    There is no evident cervical lymphadenopathy. The fascial spaces in  the neck are intact bilaterally. The major vascular structures in the  neck appear unremarkable.    Evaluation of the osseous structures demonstrate no worrisome lytic or  sclerotic lesion. Multilevel degenerative changes of the cervical  spine. There is moderate spinal canal narrowing most prominent at the  C5-6 level. There is multilevel neural foraminal narrowing. The  visualized paranasal sinuses are clear. The mastoid air cells are  clear.     The visualized lung apices are clear.  Impression: Impression:  1. No evidence of  thrombus intracranially within the major  intracranial venous sinuses.  2. Normal CT study of the neck with contrast.  No evident mass or  adenopathy within the neck.    I have personally reviewed the examination and initial interpretation  and I agree with the findings.    SOPHIA MARTINEZ MD       [facility] [employer] [facility] Thor Outlet Syn Bilat  THORACIC INLET/OUTLET DUPLEX ULTRASOUND 5/23/2022    FINDINGS:  RIGHT:       REST:            INTERNAL JUGULAR VEIN: 45 cm/s, phasic, fully compressible            INNOMINATE VEIN: 40 cm/s, phasic            SUBCLAVIAN VEIN, medial: 150 cm/s, phasic            SUBCLAVIAN VEIN, mid: 79 cm/s, phasic, fully compressible            SUBCLAVIAN VEIN, lateral: 58 cm/s, phasic, fully  compressible            AXILLARY VEIN: 46 cm/s, phasic, fully compressible         MID SUBCLAVIAN VEIN, sitting erect:             0 degrees: 34 cm/s, phasic            90 degrees: 43 cm/s, phasic            135 degrees: 192 cm/s, phasic            180 degrees: 173 cm/s, phasic         INTERNAL JUGULAR VEIN, sitting erect:            Neutral: 70 cm/s, phasic            Right: 80 cm/s, phasic            Left: 112 cm/s, phasic            Extension: 109 cm/s, phasic            Flexion: 106 cm/s, phasic         PPGs:            Baseline: Normal            Arms 90: Normal            Arms 180: ABNORMAL - diminished              : Normal             head right: ABNORMAL - diminished             head left: Normal    LEFT:       REST:            INTERNAL JUGULAR VEIN: 62 cm/s, phasic, fully compressible            INNOMINATE VEIN: 62 cm/s, phasic            SUBCLAVIAN VEIN, medial: 110 cm/s, phasic            SUBCLAVIAN VEIN, mid: 101 cm/s, phasic, fully compressible            SUBCLAVIAN VEIN, lateral: 88 cm/s, phasic, fully  compressible            AXILLARY VEIN: 39 cm/s, phasic, fully compressible         MID SUBCLAVIAN VEIN, sitting erect:            0 degrees: 17 cm/s, blunted            90 degrees: 10 cm/s, blunted            135 degrees: 135 cm/s, phasic            180 degrees: 189 cm/s, phasic         INTERNAL JUGULAR VEIN, sitting erect:            Neutral: 192 cm/s, phasic            Right: 227 cm/s, phasic            Left: 67 cm/s, phasic            Extension: 126 cm/s, phasic            Flexion: 109 cm/s, phasic        PPGs:            Baseline: Normal            Arms 90: Normal            Arms 180: ABNORMAL - OCCLUDED            : ABNORMAL - diminished             head right: ABNORMAL - diminished             head left: ABNORMAL - diminished  Impression: IMPRESSION: Thoracic outlet/inlet physiology suggested. Correlate for  symptoms.    1. RIGHT:       A. 3.75 velocity ratio from the medial subclavian vein to the  innominate vein may suggest stenosis at rest. Waveforms peripherally  remain  phasic.       B. Subclavian venous narrowing suggested in 135 and 180 degrees.  No occlusion demonstrated.       C. No internal jugular venous stenosis suggested with maneuvers.       D. PPG diminished in Arms 180 and  head right. No  occlusion demonstrated.    2. LEFT:       A. No subclavian venous stenosis suggested at rest.       B. Subclavian waveforms blunted in 0 and 90 degrees, etiology not  demonstrated. Subclavian venous narrowing suggested in 135 and 180  degrees. No occlusion demonstrated.       C. No internal jugular venous stenosis suggested with maneuvers.       D. PPG occluded in Arms 180. PPG diminished in   positions.    EMERY BARBER MD     76-minutes were spent in evaluation, examination, and documentation.        Sincerely,    Den REYNOSO Cha, MD

## 2022-05-29 ENCOUNTER — HEALTH MAINTENANCE LETTER (OUTPATIENT)
Age: 67
End: 2022-05-29

## 2022-10-03 ENCOUNTER — HEALTH MAINTENANCE LETTER (OUTPATIENT)
Age: 67
End: 2022-10-03

## 2023-06-04 ENCOUNTER — HEALTH MAINTENANCE LETTER (OUTPATIENT)
Age: 68
End: 2023-06-04

## 2024-07-27 ENCOUNTER — HEALTH MAINTENANCE LETTER (OUTPATIENT)
Age: 69
End: 2024-07-27